# Patient Record
Sex: FEMALE | Race: WHITE | NOT HISPANIC OR LATINO | URBAN - NONMETROPOLITAN AREA
[De-identification: names, ages, dates, MRNs, and addresses within clinical notes are randomized per-mention and may not be internally consistent; named-entity substitution may affect disease eponyms.]

---

## 2018-04-09 ENCOUNTER — APPOINTMENT (RX ONLY)
Dept: URBAN - NONMETROPOLITAN AREA CLINIC 13 | Facility: CLINIC | Age: 58
Setting detail: DERMATOLOGY
End: 2018-04-09

## 2018-04-09 DIAGNOSIS — Z41.9 ENCOUNTER FOR PROCEDURE FOR PURPOSES OTHER THAN REMEDYING HEALTH STATE, UNSPECIFIED: ICD-10-CM

## 2018-04-09 PROCEDURE — ? IPL COSMETIC

## 2018-04-09 ASSESSMENT — LOCATION ZONE DERM: LOCATION ZONE: FACE

## 2018-04-09 ASSESSMENT — LOCATION SIMPLE DESCRIPTION DERM: LOCATION SIMPLE: RIGHT FOREHEAD

## 2018-04-09 ASSESSMENT — LOCATION DETAILED DESCRIPTION DERM: LOCATION DETAILED: RIGHT INFERIOR MEDIAL FOREHEAD

## 2018-04-09 NOTE — PROCEDURE: IPL COSMETIC
Wavelength (Include Units): 515,590, 800ST
Pre-Procedure Care: Prior to the procedure the patient and all present had protective eyewear in place and a warning sign was placed on the door.
Eye Protection: wavelength-specific goggles
Pulse Duration (Include Units): 10, 10 sec
Number Of Passes: 2,2,3
Detail Level: Detailed
End-Point And Post-Procedure Care: The procedure continued until mild erythema and darkening of pigment.
Consent: Prior to the procedure consent obtained, risks reviewed including but not limited to crusting, scabbing, blistering, scarring, darker or lighter pigmentary change, incidental hair removal, bruising, and/or incomplete removal.
Indication: age spots, redness,st
Post-Care Instructions: I reviewed with the patient in detail post-care instructions. Patient should stay away from the sun and wear sun protection until treated areas are fully healed.  I counseled on the need for more than one treatment to get desired result, and to absolutely not pick at pigment for she is a tiera 5 and at risk more for PIH.  I treated at a tiera 5 setting.  Treated face, neck and chest.
Ipl Type: forever young
Price (Use Numbers Only, No Special Characters Or $): 500
Fluence (Include Units): 8,12: 8,12: 12st

## 2018-09-24 ENCOUNTER — APPOINTMENT (RX ONLY)
Dept: URBAN - NONMETROPOLITAN AREA CLINIC 13 | Facility: CLINIC | Age: 58
Setting detail: DERMATOLOGY
End: 2018-09-24

## 2018-09-24 DIAGNOSIS — Z41.9 ENCOUNTER FOR PROCEDURE FOR PURPOSES OTHER THAN REMEDYING HEALTH STATE, UNSPECIFIED: ICD-10-CM

## 2018-09-24 PROCEDURE — ? IPL COSMETIC

## 2018-09-24 ASSESSMENT — LOCATION ZONE DERM: LOCATION ZONE: FACE

## 2018-09-24 ASSESSMENT — LOCATION DETAILED DESCRIPTION DERM: LOCATION DETAILED: GLABELLA

## 2018-09-24 ASSESSMENT — LOCATION SIMPLE DESCRIPTION DERM: LOCATION SIMPLE: GLABELLA

## 2018-09-24 NOTE — PROCEDURE: IPL COSMETIC
Post-Care Instructions: I reviewed with the patient in detail post-care instructions. Patient should stay away from the sun and wear sun protection until treated areas are fully healed.
Ipl Type: forever Young
Consent: Prior to the procedure consent obtained, risks reviewed including but not limited to crusting, scabbing, blistering, scarring, darker or lighter pigmentary change, incidental hair removal, bruising, and/or incomplete removal.
Number Of Passes: 2,2,3
Indication: age spots, redness ST
Pulse Duration (Include Units): 10,20,10 sec
Fluence (Include Units): 7,9: 8,10; 12
Wavelength (Include Units): 515, 560,ST face, neck
Detail Level: Detailed
Price (Use Numbers Only, No Special Characters Or $): 500
Pre-Procedure Care: Prior to the procedure the patient and all present had protective eyewear in place and a warning sign was placed on the door..
Eye Protection: wavelength-specific goggles
End-Point And Post-Procedure Care: The procedure continued until mild erythema and darkening of pigment.
Clinical Endpoint: erythema

## 2018-12-06 ENCOUNTER — APPOINTMENT (RX ONLY)
Dept: URBAN - METROPOLITAN AREA CLINIC 1 | Facility: CLINIC | Age: 58
Setting detail: DERMATOLOGY
End: 2018-12-06

## 2018-12-06 VITALS — WEIGHT: 125 LBS | HEIGHT: 63 IN

## 2018-12-06 DIAGNOSIS — L82.0 INFLAMED SEBORRHEIC KERATOSIS: ICD-10-CM

## 2018-12-06 PROBLEM — L70.0 ACNE VULGARIS: Status: ACTIVE | Noted: 2018-12-06

## 2018-12-06 PROBLEM — L55.1 SUNBURN OF SECOND DEGREE: Status: ACTIVE | Noted: 2018-12-06

## 2018-12-06 PROBLEM — L57.0 ACTINIC KERATOSIS: Status: ACTIVE | Noted: 2018-12-06

## 2018-12-06 PROBLEM — I10 ESSENTIAL (PRIMARY) HYPERTENSION: Status: ACTIVE | Noted: 2018-12-06

## 2018-12-06 PROBLEM — J30.1 ALLERGIC RHINITIS DUE TO POLLEN: Status: ACTIVE | Noted: 2018-12-06

## 2018-12-06 PROCEDURE — 17110 DESTRUCTION B9 LES UP TO 14: CPT

## 2018-12-06 PROCEDURE — ? LIQUID NITROGEN

## 2018-12-06 ASSESSMENT — LOCATION DETAILED DESCRIPTION DERM: LOCATION DETAILED: RIGHT INFERIOR FOREHEAD

## 2018-12-06 ASSESSMENT — LOCATION SIMPLE DESCRIPTION DERM: LOCATION SIMPLE: RIGHT FOREHEAD

## 2018-12-06 ASSESSMENT — LOCATION ZONE DERM: LOCATION ZONE: FACE

## 2018-12-06 NOTE — PROCEDURE: LIQUID NITROGEN
Detail Level: Detailed
Medical Necessity Clause: This procedure was medically necessary because the lesions that were treated were:
Medical Necessity Information: It is in your best interest to select a reason for this procedure from the list below. All of these items fulfill various CMS LCD requirements except the new and changing color options.
Post-Care Instructions: I reviewed with the patient in detail post-care instructions. Patient is to wear sunprotection, and avoid picking at any of the treated lesions. Pt may apply Vaseline to crusted or scabbing areas.
Consent: The patient's consent was obtained including but not limited to risks of crusting, scabbing, blistering, scarring, darker or lighter pigmentary change, recurrence, incomplete removal and infection.
Include Z78.9 (Other Specified Conditions Influencing Health Status) As An Associated Diagnosis?: No

## 2019-04-29 ENCOUNTER — APPOINTMENT (RX ONLY)
Dept: URBAN - NONMETROPOLITAN AREA CLINIC 13 | Facility: CLINIC | Age: 59
Setting detail: DERMATOLOGY
End: 2019-04-29

## 2019-04-29 DIAGNOSIS — Z41.9 ENCOUNTER FOR PROCEDURE FOR PURPOSES OTHER THAN REMEDYING HEALTH STATE, UNSPECIFIED: ICD-10-CM

## 2019-04-29 PROCEDURE — ? SCITON BBL

## 2019-04-29 ASSESSMENT — LOCATION SIMPLE DESCRIPTION DERM: LOCATION SIMPLE: GLABELLA

## 2019-04-29 ASSESSMENT — LOCATION ZONE DERM: LOCATION ZONE: FACE

## 2019-04-29 ASSESSMENT — LOCATION DETAILED DESCRIPTION DERM: LOCATION DETAILED: GLABELLA

## 2019-04-29 NOTE — PROCEDURE: SCITON BBL
Cooling (In C): 15
Repetition Rate (Hz): 10
Passes: 2
Pulse Duration Units: milliseconds
Consent: Written consent obtained, risks reviewed including but not limited to crusting, scabbing, blistering, scarring, darker or lighter pigmentary change, bruising, and/or incomplete response.
Cooling ?: Yes
Location Override (Will Not Show Above If Text Entered): under eyes
Spot Size: Finesse Adapter Size: 15 x 15 mm square
Post-Care Instructions: I reviewed with the patient in detail post-care instructions. Patient should stay away from the sun and wear sun protection until treated areas are fully healed.
Fluence (J/Cm2): 20
Passes: 1
Indication Override (Will Not Show Above If Text Entered): Base Pass
Anesthesia Volume In Cc: 0
Price (Use Numbers Only, No Special Characters Or $): 500
Hide Repetition Rate?: No
Repetition Rate (Hz): 12
Preprocedure Text: The treatment areas were thoroughly cleaned. Clear ultrasound gel was applied to the treatment area. The area was treated with no immediate stacking of pulses.
Location Override (Will Not Show Above If Text Entered): nose
Total Area (Optional- Include Units): face, neck
Post Procedure Text: The patient tolerated the procedure well. Post care was reviewed with the patient.
Fluence (J/Cm2): 25
Fluence (J/Cm2): 8
Detail Level: Zone
Spot Size: Finesse Adapter Size: 7 mm round
Spot Size: Finesse Adapter Size: 15 x 45 mm (No Finesse Adapter)

## 2019-07-15 ENCOUNTER — APPOINTMENT (RX ONLY)
Dept: URBAN - NONMETROPOLITAN AREA CLINIC 13 | Facility: CLINIC | Age: 59
Setting detail: DERMATOLOGY
End: 2019-07-15

## 2019-07-15 DIAGNOSIS — D485 NEOPLASM OF UNCERTAIN BEHAVIOR OF SKIN: ICD-10-CM

## 2019-07-15 PROBLEM — D48.5 NEOPLASM OF UNCERTAIN BEHAVIOR OF SKIN: Status: ACTIVE | Noted: 2019-07-15

## 2019-07-15 PROCEDURE — 11104 PUNCH BX SKIN SINGLE LESION: CPT

## 2019-07-15 PROCEDURE — ? ADDITIONAL NOTES

## 2019-07-15 PROCEDURE — ? BIOPSY BY PUNCH METHOD

## 2019-07-15 ASSESSMENT — LOCATION SIMPLE DESCRIPTION DERM: LOCATION SIMPLE: LEFT FOREARM

## 2019-07-15 ASSESSMENT — LOCATION DETAILED DESCRIPTION DERM: LOCATION DETAILED: LEFT PROXIMAL DORSAL FOREARM

## 2019-07-15 ASSESSMENT — LOCATION ZONE DERM: LOCATION ZONE: ARM

## 2019-07-15 NOTE — PROCEDURE: BIOPSY BY PUNCH METHOD
X Size Of Lesion In Cm (Optional): 0
Detail Level: Detailed
Render Post-Care Instructions In Note?: no
Anesthesia Type: 1% lidocaine without epinephrine and a 1:10 solution of 8.4% sodium bicarbonate
Lab: [id number]
Post-Care Instructions: I reviewed with the patient in detail post-care instructions. Patient is to keep the biopsy site dry overnight, and then apply bacitracin twice daily until healed. Patient may apply hydrogen peroxide soaks to remove any crusting.
Dressing: bandage
Biopsy Type: H and E
Hemostasis: None
Was A Bandage Applied: Yes
Lab Facility: [id number]
Billing Type: Third-Party Bill
Home Suture Removal Text: Patient was provided a home suture removal kit and will remove their sutures at home.  If they have any questions or difficulties they will call the office.
Epidermal Sutures: 5-0 Novafil
Anesthesia Volume In Cc: 0.5
Consent: Verbal consent was obtained, wound care instructions given and the risks were reviewed including but not limited to scarring, infection, bleeding, scabbing, incomplete removal, nerve damage and allergy to anesthesia.
Notification Instructions: Patient will be notified of biopsy results. However, patient instructed to call the office if not contacted within 2 weeks.
Wound Care: Petrolatum
Punch Size In Mm: 3

## 2019-07-29 ENCOUNTER — APPOINTMENT (RX ONLY)
Dept: URBAN - NONMETROPOLITAN AREA CLINIC 13 | Facility: CLINIC | Age: 59
Setting detail: DERMATOLOGY
End: 2019-07-29

## 2019-07-29 DIAGNOSIS — Z41.9 ENCOUNTER FOR PROCEDURE FOR PURPOSES OTHER THAN REMEDYING HEALTH STATE, UNSPECIFIED: ICD-10-CM

## 2019-07-29 PROCEDURE — ? SCITON BBL

## 2019-07-29 ASSESSMENT — LOCATION SIMPLE DESCRIPTION DERM: LOCATION SIMPLE: GLABELLA

## 2019-07-29 ASSESSMENT — LOCATION DETAILED DESCRIPTION DERM: LOCATION DETAILED: GLABELLA

## 2019-07-29 ASSESSMENT — LOCATION ZONE DERM: LOCATION ZONE: FACE

## 2019-07-29 NOTE — PROCEDURE: SCITON BBL
Cooling ?: Yes
Spot Size: Finesse Adapter Size: 15 x 15 mm square
Hide Repetition Rate?: No
Post Procedure Text: The patient tolerated the procedure well. Post care was reviewed with the patient.
Cooling (In C): 15
Price (Use Numbers Only, No Special Characters Or $): 500
Fluence (J/Cm2): 8
Fluence (J/Cm2): 20
Repetition Rate (Hz): 0
Repetition Rate (Hz): 10
Pulse Duration Units: milliseconds
Passes: 3
Spot Size: Finesse Adapter Size: 7 mm round
Repetition Rate (Hz): 12
Total Area (Optional- Include Units): face, neck
Spot Size: Finesse Adapter Size: 15 x 45 mm (No Finesse Adapter)
Location Override (Will Not Show Above If Text Entered): under eyes
Passes: 2
Indication Override (Will Not Show Above If Text Entered): Base Pass
Treatment Number: 1
Consent: Written consent obtained, risks reviewed including but not limited to crusting, scabbing, blistering, scarring, darker or lighter pigmentary change, bruising, and/or incomplete response.
Preprocedure Text: The treatment areas were thoroughly cleaned. Clear ultrasound gel was applied to the treatment area. The area was treated with no immediate stacking of pulses.
Post-Care Instructions: I reviewed with the patient in detail post-care instructions. Patient should stay away from the sun and wear sun protection until treated areas are fully healed.
Detail Level: Zone

## 2020-02-27 ENCOUNTER — APPOINTMENT (RX ONLY)
Dept: URBAN - NONMETROPOLITAN AREA CLINIC 13 | Facility: CLINIC | Age: 60
Setting detail: DERMATOLOGY
End: 2020-02-27

## 2020-02-27 DIAGNOSIS — Z41.9 ENCOUNTER FOR PROCEDURE FOR PURPOSES OTHER THAN REMEDYING HEALTH STATE, UNSPECIFIED: ICD-10-CM

## 2020-02-27 PROCEDURE — ? SCITON BBL

## 2020-02-27 ASSESSMENT — LOCATION SIMPLE DESCRIPTION DERM
LOCATION SIMPLE: GLABELLA
LOCATION SIMPLE: RIGHT ANTERIOR NECK
LOCATION SIMPLE: LEFT ANTERIOR NECK

## 2020-02-27 ASSESSMENT — LOCATION DETAILED DESCRIPTION DERM
LOCATION DETAILED: LEFT SUPERIOR LATERAL NECK
LOCATION DETAILED: GLABELLA
LOCATION DETAILED: LEFT INFERIOR ANTERIOR NECK
LOCATION DETAILED: RIGHT INFERIOR ANTERIOR NECK
LOCATION DETAILED: LEFT INFERIOR LATERAL NECK
LOCATION DETAILED: RIGHT SUPERIOR ANTERIOR NECK
LOCATION DETAILED: RIGHT SUPERIOR LATERAL NECK
LOCATION DETAILED: LEFT SUPERIOR ANTERIOR NECK
LOCATION DETAILED: RIGHT INFERIOR LATERAL NECK

## 2020-02-27 ASSESSMENT — LOCATION ZONE DERM
LOCATION ZONE: NECK
LOCATION ZONE: FACE

## 2020-02-27 NOTE — PROCEDURE: SCITON BBL
Fluence (J/Cm2): 14
Post-Care Instructions: I reviewed with the patient in detail post-care instructions. Patient should stay away from the sun and wear sun protection until treated areas are fully healed.
Price (Use Numbers Only, No Special Characters Or $): 757
Cooling ?: Yes
Indication Override (Will Not Show Above If Text Entered): [place]
Cooling (In C): 15
Repetition Rate (Hz): 1
Location Override (Will Not Show Above If Text Entered): upper cheeks
Hide Repetition Rate?: No
Spot Size: Finesse Adapter Size: 15 x 15 mm square
Fluence (J/Cm2): 8
Indication Override (Will Not Show Above If Text Entered): around nose
Cooling (In C): 20
Cooling (In C): 30
Spot Size: Finesse Adapter Size: 15 x 45 mm (No Finesse Adapter)
Additional Comments (Optional): 15J/cm2 used with [place] spot size to treat select brown dyschromias
Pulse Duration Units: milliseconds
Passes: 2
Repetition Rate (Hz): 10
Fluence (J/Cm2): 12
Treatment Number: 5
Anesthesia Volume In Cc: 0
Passes: 3
Additional Comments (Optional): 10J/cm2 used with square spot size on upper lip, around eyes, forehead, nose
Indication Override (Will Not Show Above If Text Entered): Forever Young Base Passes
Spot Size: Finesse Adapter Size: 7 mm round
Detail Level: Zone
Pulse Duration Units: seconds
Cooling (In C): 25
Preprocedure Text: The treatment areas were thoroughly cleaned. Clear ultrasound gel was applied to the treatment area. The area was treated with no immediate stacking of pulses.
Post Procedure Text: The patient tolerated the procedure well, reaching clinical end point of mild erythema, slight darkening of dyschromias. Post care was reviewed with the patient.
Consent: Written consent obtained, risks reviewed including but not limited to crusting, scabbing, blistering, scarring, darker or lighter pigmentary change, bruising, and/or incomplete response.

## 2020-03-03 ENCOUNTER — APPOINTMENT (RX ONLY)
Dept: URBAN - NONMETROPOLITAN AREA CLINIC 13 | Facility: CLINIC | Age: 60
Setting detail: DERMATOLOGY
End: 2020-03-03

## 2020-03-03 VITALS — WEIGHT: 128 LBS | HEIGHT: 63 IN

## 2020-03-03 DIAGNOSIS — D22 MELANOCYTIC NEVI: ICD-10-CM

## 2020-03-03 DIAGNOSIS — L57.8 OTHER SKIN CHANGES DUE TO CHRONIC EXPOSURE TO NONIONIZING RADIATION: ICD-10-CM

## 2020-03-03 DIAGNOSIS — L82.1 OTHER SEBORRHEIC KERATOSIS: ICD-10-CM

## 2020-03-03 DIAGNOSIS — D18.0 HEMANGIOMA: ICD-10-CM

## 2020-03-03 PROBLEM — D18.01 HEMANGIOMA OF SKIN AND SUBCUTANEOUS TISSUE: Status: ACTIVE | Noted: 2020-03-03

## 2020-03-03 PROBLEM — D22.5 MELANOCYTIC NEVI OF TRUNK: Status: ACTIVE | Noted: 2020-03-03

## 2020-03-03 PROCEDURE — ? SUNSCREEN RECOMMENDATIONS

## 2020-03-03 PROCEDURE — ? COUNSELING

## 2020-03-03 PROCEDURE — 99213 OFFICE O/P EST LOW 20 MIN: CPT

## 2020-03-03 ASSESSMENT — LOCATION DETAILED DESCRIPTION DERM
LOCATION DETAILED: SUPERIOR THORACIC SPINE
LOCATION DETAILED: LEFT MEDIAL UPPER BACK
LOCATION DETAILED: LEFT SUPERIOR MEDIAL MIDBACK

## 2020-03-03 ASSESSMENT — LOCATION SIMPLE DESCRIPTION DERM
LOCATION SIMPLE: LEFT UPPER BACK
LOCATION SIMPLE: UPPER BACK
LOCATION SIMPLE: LEFT LOWER BACK

## 2020-03-03 ASSESSMENT — LOCATION ZONE DERM: LOCATION ZONE: TRUNK

## 2020-10-08 ENCOUNTER — APPOINTMENT (RX ONLY)
Dept: URBAN - NONMETROPOLITAN AREA CLINIC 13 | Facility: CLINIC | Age: 60
Setting detail: DERMATOLOGY
End: 2020-10-08

## 2020-10-08 DIAGNOSIS — Z41.9 ENCOUNTER FOR PROCEDURE FOR PURPOSES OTHER THAN REMEDYING HEALTH STATE, UNSPECIFIED: ICD-10-CM

## 2020-10-08 PROCEDURE — ? SCITON BBL

## 2020-10-08 ASSESSMENT — LOCATION DETAILED DESCRIPTION DERM
LOCATION DETAILED: RIGHT INFERIOR LATERAL NECK
LOCATION DETAILED: LEFT INFERIOR ANTERIOR NECK
LOCATION DETAILED: RIGHT SUPERIOR LATERAL NECK
LOCATION DETAILED: LEFT INFERIOR LATERAL NECK
LOCATION DETAILED: RIGHT SUPERIOR ANTERIOR NECK
LOCATION DETAILED: GLABELLA
LOCATION DETAILED: RIGHT INFERIOR ANTERIOR NECK
LOCATION DETAILED: LEFT SUPERIOR ANTERIOR NECK
LOCATION DETAILED: LEFT SUPERIOR LATERAL NECK

## 2020-10-08 ASSESSMENT — LOCATION ZONE DERM
LOCATION ZONE: NECK
LOCATION ZONE: FACE

## 2020-10-08 ASSESSMENT — LOCATION SIMPLE DESCRIPTION DERM
LOCATION SIMPLE: RIGHT ANTERIOR NECK
LOCATION SIMPLE: LEFT ANTERIOR NECK
LOCATION SIMPLE: GLABELLA

## 2020-10-08 NOTE — PROCEDURE: SCITON BBL
Location Override (Will Not Show Above If Text Entered): upper cheeks
Repetition Rate (Hz): 1
Cooling ?: Yes
Additional Comments (Optional): square spot sized used at 10J/cm2 on upper lip, nose, around eyes, forehead
Indication Override (Will Not Show Above If Text Entered): skin laxity
Passes: 2
Cooling (In C): 15
Spot Size: Finesse Adapter Size: 15 x 45 mm (No Finesse Adapter)
Pulse Duration Units: milliseconds
Detail Level: Zone
Repetition Rate (Hz): 10
Fluence (J/Cm2): 8
Additional Comments (Optional): 13J/cm2 used on forehead-select browns treated with1-2 additional passes using [place] spot size using 15J/cm2, 10ms, 25C-neck treated with no finesse adapter using 10J/cm2, 15ms, 20C
Cooling (In C): 30
Fluence (J/Cm2): 25
Passes: 3
Anesthesia Volume In Cc: 0
Pulse Duration: 20
Spot Size: Finesse Adapter Size: 15 x 15 mm square
Fluence (J/Cm2): 12
Preprocedure Text: The treatment areas were thoroughly cleaned. Clear ultrasound gel was applied to the treatment area. The area was treated with no immediate stacking of pulses.
Price (Use Numbers Only, No Special Characters Or $): 699
Additional Comments (Optional): around nose treated with [place] spot size using 20J/cm2, 15ms, 20C
Consent: Written consent obtained, risks reviewed including but not limited to crusting, scabbing, blistering, scarring, darker or lighter pigmentary change, bruising, and/or incomplete response.
Post Procedure Text: The patient tolerated the procedure well, reaching clinical end point of mild erythema, slight darkening to darkening of dyschromias. Post care was reviewed with the patient.
Indication Override (Will Not Show Above If Text Entered): Forever Young Base Passes
Fluence (J/Cm2): 14
Pulse Duration Units: seconds
Hide Repetition Rate?: No
Post-Care Instructions: I reviewed with the patient in detail post-care instructions. Patient should stay away from the sun and wear sun protection until treated areas are fully healed.

## 2021-05-17 ENCOUNTER — APPOINTMENT (RX ONLY)
Dept: URBAN - NONMETROPOLITAN AREA CLINIC 13 | Facility: CLINIC | Age: 61
Setting detail: DERMATOLOGY
End: 2021-05-17

## 2021-05-17 DIAGNOSIS — Z41.9 ENCOUNTER FOR PROCEDURE FOR PURPOSES OTHER THAN REMEDYING HEALTH STATE, UNSPECIFIED: ICD-10-CM

## 2021-05-17 PROCEDURE — ? SCITON BBL

## 2021-05-17 ASSESSMENT — LOCATION SIMPLE DESCRIPTION DERM
LOCATION SIMPLE: RIGHT ANTERIOR NECK
LOCATION SIMPLE: LEFT ANTERIOR NECK
LOCATION SIMPLE: GLABELLA

## 2021-05-17 ASSESSMENT — LOCATION DETAILED DESCRIPTION DERM
LOCATION DETAILED: LEFT INFERIOR LATERAL NECK
LOCATION DETAILED: GLABELLA
LOCATION DETAILED: RIGHT INFERIOR LATERAL NECK
LOCATION DETAILED: RIGHT INFERIOR ANTERIOR NECK
LOCATION DETAILED: LEFT INFERIOR ANTERIOR NECK
LOCATION DETAILED: LEFT SUPERIOR ANTERIOR NECK
LOCATION DETAILED: RIGHT SUPERIOR ANTERIOR NECK
LOCATION DETAILED: LEFT SUPERIOR LATERAL NECK
LOCATION DETAILED: RIGHT SUPERIOR LATERAL NECK

## 2021-05-17 ASSESSMENT — LOCATION ZONE DERM
LOCATION ZONE: FACE
LOCATION ZONE: NECK

## 2021-05-17 NOTE — PROCEDURE: SCITON BBL
Preprocedure Text: The treatment areas were thoroughly cleaned. Clear ultrasound gel was applied to the treatment area. The area was treated with no immediate stacking of pulses.
Pulse Duration: 20
Cooling ?: Yes
Fluence (J/Cm2): 15
Additional Comments (Optional): 13J/cm2 used on forehead, select browns spot treated using [place] spot at 15J/cm2, 10ms, 25C x1-2 passes-neck treated with no adapter at 10J/cm2, 15ms, 20C x1 passssss
Price (Use Numbers Only, No Special Characters Or $): 500
Consent: Written consent obtained, risks reviewed including but not limited to crusting, scabbing, blistering, scarring, darker or lighter pigmentary change, bruising, and/or incomplete response.
Post Procedure Text: The patient tolerated the procedure well, reaching clinical end point of mild erythema, slight darkening to darkening of dyschromias. Post care was reviewed with the patient.
Fluence (J/Cm2): 25
Pulse Duration Units: milliseconds
Passes: 1
Post-Care Instructions: I reviewed with the patient in detail post-care instructions. Patient should stay away from the sun and wear sun protection until treated areas are fully healed.
Spot Size: Finesse Adapter Size: 15 x 15 mm square
Hide Repetition Rate?: No
Additional Comments (Optional): [place] spot used to treat around nose at 20J/cm2, 15ms, 20C
Fluence (J/Cm2): 8
Spot Size: Finesse Adapter Size: 15 x 45 mm (No Finesse Adapter)
Repetition Rate (Hz): 10
Fluence (J/Cm2): 14
Detail Level: Zone
Passes: 2
Anesthesia Volume In Cc: 0
Additional Comments (Optional): 9J/cm2 used with square spot size-neck treated using 20ms
Indication Override (Will Not Show Above If Text Entered): Forever Young Base Passes
Location Override (Will Not Show Above If Text Entered): upper cheeks, around nose

## 2021-08-13 ENCOUNTER — APPOINTMENT (RX ONLY)
Dept: URBAN - METROPOLITAN AREA CLINIC 1 | Facility: CLINIC | Age: 61
Setting detail: DERMATOLOGY
End: 2021-08-13

## 2021-08-13 DIAGNOSIS — Z41.9 ENCOUNTER FOR PROCEDURE FOR PURPOSES OTHER THAN REMEDYING HEALTH STATE, UNSPECIFIED: ICD-10-CM

## 2021-08-13 PROCEDURE — ? INFINI

## 2021-08-13 ASSESSMENT — LOCATION SIMPLE DESCRIPTION DERM
LOCATION SIMPLE: LEFT FOREHEAD
LOCATION SIMPLE: RIGHT FOREHEAD
LOCATION SIMPLE: LEFT CHEEK
LOCATION SIMPLE: LEFT SCALP
LOCATION SIMPLE: INFERIOR FOREHEAD
LOCATION SIMPLE: CHIN
LOCATION SIMPLE: SUPERIOR FOREHEAD
LOCATION SIMPLE: SUBMENTAL CHIN
LOCATION SIMPLE: RIGHT CHEEK
LOCATION SIMPLE: NECK
LOCATION SIMPLE: NOSE

## 2021-08-13 ASSESSMENT — LOCATION DETAILED DESCRIPTION DERM
LOCATION DETAILED: LEFT CENTRAL BUCCAL CHEEK
LOCATION DETAILED: LEFT MEDIAL FRONTAL SCALP
LOCATION DETAILED: LEFT SUPERIOR CENTRAL MALAR CHEEK
LOCATION DETAILED: LEFT LATERAL FOREHEAD
LOCATION DETAILED: SUPERIOR MID FOREHEAD
LOCATION DETAILED: RIGHT CHIN
LOCATION DETAILED: LEFT INFERIOR CENTRAL MALAR CHEEK
LOCATION DETAILED: RIGHT SUPERIOR LATERAL MALAR CHEEK
LOCATION DETAILED: RIGHT CENTRAL BUCCAL CHEEK
LOCATION DETAILED: RIGHT CENTRAL LATERAL NECK
LOCATION DETAILED: NASAL SUPRATIP
LOCATION DETAILED: RIGHT INFERIOR LATERAL FOREHEAD
LOCATION DETAILED: INFERIOR MID FOREHEAD
LOCATION DETAILED: SUBMENTAL CHIN
LOCATION DETAILED: RIGHT INFERIOR CENTRAL MALAR CHEEK
LOCATION DETAILED: RIGHT SUPERIOR FOREHEAD
LOCATION DETAILED: LEFT CENTRAL LATERAL NECK

## 2021-08-13 ASSESSMENT — LOCATION ZONE DERM
LOCATION ZONE: FACE
LOCATION ZONE: NECK
LOCATION ZONE: NOSE
LOCATION ZONE: SCALP

## 2021-08-13 NOTE — PROCEDURE: INFINI
Detail Level: Detailed
Passes (Will Not Render If 0): 3
Passes (Will Not Render If 0): 0
Needle Depth (Optional): 1.0 mm
Location #3: under eye
Intensity (Optional): High
Level: 1
Consent: Written consent obtained, risks reviewed including but not limited to pain, scarring, infection and incomplete improvement.  Common side effects include temporary redness and mild sunburn like effects that may last a few hours to a couple of days. Pigmentary changes can also occur. Patient understands the procedure is cosmetic in nature and will require out of pocket payment.
Needle Depth (Optional): 2.0 mm
Passes (Will Not Render If 0): 2
Needle Depth (Optional): 2.25 mm
Intensity (Optional): Medium
Post-Care Instructions: After the procedure, take precautions against sun exposure. Do not rub or scrub the area and wait 12 hours before applying sunscreen. Samples of Lytic given as well as Resveratrol and CE Ferulic. SS
Price (Use Numbers Only, No Special Characters Or $): 1200.00
Tip Size (Optional): 49 needles (7x7 needles, 10x10 mm)
Location #1: forehead
Location #4: neck
Location #2: lower face

## 2021-11-11 ENCOUNTER — APPOINTMENT (RX ONLY)
Dept: URBAN - NONMETROPOLITAN AREA CLINIC 13 | Facility: CLINIC | Age: 61
Setting detail: DERMATOLOGY
End: 2021-11-11

## 2021-11-11 DIAGNOSIS — Z41.9 ENCOUNTER FOR PROCEDURE FOR PURPOSES OTHER THAN REMEDYING HEALTH STATE, UNSPECIFIED: ICD-10-CM

## 2021-11-11 PROCEDURE — ? ADDITIONAL NOTES

## 2021-11-11 NOTE — PROCEDURE: ADDITIONAL NOTES
Render Risk Assessment In Note?: no
Detail Level: Simple
Additional Notes: Location: lower face, submental\\nNeedle Depth: 2.25 mm\\nPasses: 1\\nIntensity: High\\nLevel: 1\\n\\nLocation: lower face, submenatl\\nNeedle Depth: 2.0 mm\\nPasses: 1\\nIntensity: High\\nLevel: 1\\n\\nLocation: lower face , submental\\nNeedle Depth: 1.5 mm\\nPasses: 1\\nIntensity: Medium\\nLevel: 1\\n\\nLocation: neck\\nNeedle Depth: 2.0 mm\\nPasses: 1\\nIntensity: Medium\\nLevel: 1\\n\\nLocation: neck\\nNeedle Depth: 1.5 mm\\nPasses: 1\\nIntensity: Medium\\nLevel: 1\\n\\nLocation: neck\\nNeedle Depth: 1.5 mm\\nPasses: 1\\nIntensity: Medium\\nLevel: 1\\n\\nLocation: under eye, forehead\\nNeedle Depth: 1.0 mm\\nPasses: 1\\nIntensity: Medium\\nLevel: 1\\n\\nLocation: under eye, forehead\\nNeedle Depth: 1.0 mm\\nPasses: 2\\nIntensity: Low\\nLevel: 1\\n\\nWritten consent obtained, risks reviewed including but not limited to pain, scarring, infection and incomplete improvement. Common side effects include temporary redness and mild sunburn like effects that may last a few hours to a couple of days. Pigmentary changes can also occur. Patient understands the procedure is cosmetic in nature and will require out of pocket payment. After the procedure, take precautions against sun exposure. Do not rub or scrub the area and wait 12 hours before applying make-up. Last of package of 3.

## 2022-09-15 ENCOUNTER — APPOINTMENT (RX ONLY)
Dept: URBAN - NONMETROPOLITAN AREA CLINIC 13 | Facility: CLINIC | Age: 62
Setting detail: DERMATOLOGY
End: 2022-09-15

## 2022-09-15 DIAGNOSIS — Z41.9 ENCOUNTER FOR PROCEDURE FOR PURPOSES OTHER THAN REMEDYING HEALTH STATE, UNSPECIFIED: ICD-10-CM

## 2022-09-15 PROCEDURE — ? SCITON BBL

## 2022-09-15 ASSESSMENT — LOCATION SIMPLE DESCRIPTION DERM: LOCATION SIMPLE: GLABELLA

## 2022-09-15 ASSESSMENT — LOCATION DETAILED DESCRIPTION DERM: LOCATION DETAILED: GLABELLA

## 2022-09-15 ASSESSMENT — LOCATION ZONE DERM: LOCATION ZONE: FACE

## 2022-09-15 NOTE — PROCEDURE: SCITON BBL
Passes: 1
Consent: Written consent obtained, risks reviewed including but not limited to crusting, scabbing, blistering, scarring, darker or lighter pigmentary change, bruising, and/or incomplete response.
Pulse Duration Units: microseconds
Spot Size: Finesse Adapter Size: 15 x 15 mm square
Pulse Duration Units: milliseconds
Cooling ?: Yes
Passes: 2
Cooling (In C): 15
Price (Use Numbers Only, No Special Characters Or $): 500
Indication Override (Will Not Show Above If Text Entered): base
Post-Care Instructions: I reviewed with the patient in detail post-care instructions. Patient should stay away from the sun and wear sun protection until treated areas are fully healed.
Location Override (Will Not Show Above If Text Entered): nose
Repetition Rate (Hz): 10
Repetition Rate (Hz): 12
Hide Repetition Rate?: No
Fluence (J/Cm2): 22
Fluence (J/Cm2): 8
Post Procedure Text: The patient tolerated the procedure well. Post care was reviewed with the patient.
Pulse Duration: 20
Spot Size: Finesse Adapter Size: 15 x 45 mm (No Finesse Adapter)
Spot Size: Finesse Adapter Size: 7 mm round
Fluence (J/Cm2): 25
Detail Level: Zone
Anesthesia Volume In Cc: 0
Preprocedure Text: The treatment areas were thoroughly cleaned. Clear ultrasound gel was applied to the treatment area. The area was treated with no immediate stacking of pulses.
Treated Area: small area

## 2023-10-03 ENCOUNTER — APPOINTMENT (RX ONLY)
Dept: URBAN - NONMETROPOLITAN AREA CLINIC 13 | Facility: CLINIC | Age: 63
Setting detail: DERMATOLOGY
End: 2023-10-03

## 2023-10-03 DIAGNOSIS — D18.0 HEMANGIOMA: ICD-10-CM | Status: STABLE

## 2023-10-03 DIAGNOSIS — L57.8 OTHER SKIN CHANGES DUE TO CHRONIC EXPOSURE TO NONIONIZING RADIATION: ICD-10-CM | Status: STABLE

## 2023-10-03 DIAGNOSIS — D22 MELANOCYTIC NEVI: ICD-10-CM | Status: STABLE

## 2023-10-03 PROBLEM — D22.5 MELANOCYTIC NEVI OF TRUNK: Status: ACTIVE | Noted: 2023-10-03

## 2023-10-03 PROBLEM — D18.01 HEMANGIOMA OF SKIN AND SUBCUTANEOUS TISSUE: Status: ACTIVE | Noted: 2023-10-03

## 2023-10-03 PROCEDURE — ? COUNSELING

## 2023-10-03 PROCEDURE — 99213 OFFICE O/P EST LOW 20 MIN: CPT

## 2023-10-03 PROCEDURE — ? SUNSCREEN RECOMMENDATIONS

## 2023-10-03 ASSESSMENT — LOCATION SIMPLE DESCRIPTION DERM
LOCATION SIMPLE: CHEST
LOCATION SIMPLE: RIGHT SHOULDER
LOCATION SIMPLE: RIGHT UPPER BACK

## 2023-10-03 ASSESSMENT — LOCATION ZONE DERM
LOCATION ZONE: TRUNK
LOCATION ZONE: ARM

## 2023-10-03 ASSESSMENT — LOCATION DETAILED DESCRIPTION DERM
LOCATION DETAILED: RIGHT MEDIAL SUPERIOR CHEST
LOCATION DETAILED: RIGHT MID-UPPER BACK
LOCATION DETAILED: RIGHT ANTERIOR SHOULDER

## 2023-10-31 ENCOUNTER — APPOINTMENT (RX ONLY)
Dept: URBAN - NONMETROPOLITAN AREA CLINIC 13 | Facility: CLINIC | Age: 63
Setting detail: DERMATOLOGY
End: 2023-10-31

## 2023-10-31 DIAGNOSIS — Z41.9 ENCOUNTER FOR PROCEDURE FOR PURPOSES OTHER THAN REMEDYING HEALTH STATE, UNSPECIFIED: ICD-10-CM

## 2023-10-31 PROCEDURE — ? SCITON BBL

## 2023-10-31 ASSESSMENT — LOCATION ZONE DERM: LOCATION ZONE: FACE

## 2023-10-31 ASSESSMENT — LOCATION SIMPLE DESCRIPTION DERM: LOCATION SIMPLE: INFERIOR FOREHEAD

## 2023-10-31 ASSESSMENT — LOCATION DETAILED DESCRIPTION DERM: LOCATION DETAILED: INFERIOR MID FOREHEAD

## 2023-10-31 NOTE — PROCEDURE: SCITON BBL
Spot Size: Finesse Adapter Size: 15 x 45 mm (No Finesse Adapter)
Pulse Duration Units: milliseconds
Pulse Duration: 25
Pulse Duration: 20
Passes: 1
Passes: 2
Cooling (In C): 15
Detail Level: Zone
Repetition Rate (Hz): 10
Indication Override (Will Not Show Above If Text Entered): base
Spot Size: Finesse Adapter Size: 15 x 15 mm square
Cooling ?: Yes
Indication Override (Will Not Show Above If Text Entered): skin tyte
Consent: Written consent obtained, risks reviewed including but not limited to crusting, scabbing, blistering, scarring, darker or lighter pigmentary change, bruising, and/or incomplete response.
Fluence (J/Cm2): 13
Post-Care Instructions: I reviewed with the patient in detail post-care instructions. Patient should stay away from the sun and wear sun protection until treated areas are fully healed.
Hide Repetition Rate?: No
Repetition Rate (Hz): 12
Anesthesia Volume In Cc: 0
Preprocedure Text: The treatment areas were thoroughly cleaned. Clear ultrasound gel was applied to the treatment area. The area was treated with no immediate stacking of pulses.
Price (Use Numbers Only, No Special Characters Or $): 894
Fluence (J/Cm2): 8
Treated Area: large area
Post Procedure Text: The patient tolerated the procedure well. Post care was reviewed with the patient.
Passes: 3
Pulse Duration Units: seconds

## 2024-08-27 ENCOUNTER — APPOINTMENT (RX ONLY)
Dept: URBAN - NONMETROPOLITAN AREA CLINIC 13 | Facility: CLINIC | Age: 64
Setting detail: DERMATOLOGY
End: 2024-08-27

## 2024-08-27 DIAGNOSIS — Z41.9 ENCOUNTER FOR PROCEDURE FOR PURPOSES OTHER THAN REMEDYING HEALTH STATE, UNSPECIFIED: ICD-10-CM

## 2024-08-27 PROCEDURE — ? SCITON BBL

## 2024-08-27 ASSESSMENT — LOCATION SIMPLE DESCRIPTION DERM: LOCATION SIMPLE: GLABELLA

## 2024-08-27 ASSESSMENT — LOCATION DETAILED DESCRIPTION DERM: LOCATION DETAILED: GLABELLA

## 2024-08-27 ASSESSMENT — LOCATION ZONE DERM: LOCATION ZONE: FACE

## 2024-08-27 NOTE — PROCEDURE: SCITON BBL
Prepaid Number Of Treatments: 0
Add Treatment 5?: yes
Pulse Duration Units: milliseconds
Hide Repetition Rate?: No
Treatment Number: 1
Cooling (In C): 15
Passes: 2
Indication Override (Will Not Show Above If Text Entered): base
Detail Level: Zone
Repetition Rate (Hz): 10
Fluence (J/Cm2): 7
Fluence (J/Cm2): 12
Pulse Duration Units: seconds
Pulse Duration: 20
Passes: 3
Post Procedure Text: The patient tolerated the procedure well. Post care was reviewed with the patient.
Preprocedure Text: The treatment areas were thoroughly cleaned. Clear ultrasound gel was applied to the treatment area. The area was treated with no immediate stacking of pulses.
Spot Size: Finesse Adapter Size: 15 x 15 mm square
Fluence (J/Cm2): 11
Spot Size: Finesse Adapter Size: 15 x 45 mm (No Finesse Adapter)
Fluence (J/Cm2): 8
Consent: Written consent obtained, risks reviewed including but not limited to crusting, scabbing, blistering, scarring, darker or lighter pigmentary change, bruising, and/or incomplete response.
Pulse Duration: 18
Fluence (J/Cm2): 21
Spot Size: Finesse Adapter Size: 11 mm round
Post-Care Instructions: I reviewed with the patient in detail post-care instructions. Patient should stay away from the sun and wear sun protection until treated areas are fully healed.
Price (Use Numbers Only, No Special Characters Or $): 110
Indication Override (Will Not Show Above If Text Entered): skin Tyte
Treated Area: medium area

## 2025-01-09 ENCOUNTER — APPOINTMENT (OUTPATIENT)
Dept: URBAN - NONMETROPOLITAN AREA CLINIC 13 | Facility: CLINIC | Age: 65
Setting detail: DERMATOLOGY
End: 2025-01-09

## 2025-01-09 DIAGNOSIS — Z41.9 ENCOUNTER FOR PROCEDURE FOR PURPOSES OTHER THAN REMEDYING HEALTH STATE, UNSPECIFIED: ICD-10-CM

## 2025-01-09 PROCEDURE — ? MICRONEEDLING

## 2025-01-09 NOTE — PROCEDURE: MICRONEEDLING
Depth In Mm (Location #5): 0.5
Price (Use Numbers Only, No Special Characters Or $): 473
Treatment Number (Optional): 1
Location #3: cheeks
Location #2: under eyes
Topical Anesthesia?: 23% lidocaine, 7% tetracaine
Infusions (Optional): growth factor
Post-Care Instructions: After the procedure, take precautions agains sun exposure. Do not apply sunscreen for 12 hours after the procedure. Do not apply make-up for 12 hours after the procedure. Avoid alcohol based toners for 10-14 days. After 2-3 days patients can return to their regular skin regimen.  Told patient she would need more than one treatment, and absolutely no sun exposure on her face.
Length Of Topical Anesthesia Application (Optional): 20 minutes
Location #1: forehead
Detail Level: Zone
# Of Treatments In Package: 0
Location #4: lip
Depth In Mm (Location #2): 0.25
Consent: Written consent obtained, risks reviewed including but not limited to pain, scarring, infection and incomplete improvement.  Patient understands the procedure is cosmetic in nature and will require out of pocket payment.
Location #5: neck

## 2025-02-20 ENCOUNTER — APPOINTMENT (OUTPATIENT)
Dept: URBAN - NONMETROPOLITAN AREA CLINIC 13 | Facility: CLINIC | Age: 65
Setting detail: DERMATOLOGY
End: 2025-02-20

## 2025-02-20 DIAGNOSIS — Z41.9 ENCOUNTER FOR PROCEDURE FOR PURPOSES OTHER THAN REMEDYING HEALTH STATE, UNSPECIFIED: ICD-10-CM

## 2025-02-20 PROCEDURE — ? MICRONEEDLING

## 2025-02-20 NOTE — PROCEDURE: MICRONEEDLING
Infusions (Optional): growth factor
Location #6: neck
Depth In Mm (Location #2): 0.25
Topical Anesthesia?: 23% lidocaine, 7% tetracaine
Depth In Mm (Location #4): 0.5
Depth In Mm (Location #6): 0.75
Location #1: forehead
Length Of Topical Anesthesia Application (Optional): 20 minutes
Consent: Written consent obtained, risks reviewed including but not limited to pain, scarring, infection and incomplete improvement.  Patient understands the procedure is cosmetic in nature and will require out of pocket payment.
How Many Cc Of Bacteriostatic 0.9% Saline Were Added?: 0
Price (Use Numbers Only, No Special Characters Or $): 055.77
Treatment Number (Optional): 2
Location #3: cheeks
# Of Treatments In Package: 3
Detail Level: Zone
Location #4: chin
Location #2: undereye and nose
Post-Care Instructions: PT was given physical copy of micro needling post care. After the procedure, take precautions against sun exposure. Do not apply sunscreen for 12 hours after the procedure. Do not apply make-up for 12 hours after the procedure. Avoid alcohol-based toners for 10-14 days. After 2-3 days patients can return to their regular skin regimen.
Location #5: upper lip

## 2025-04-03 ENCOUNTER — APPOINTMENT (OUTPATIENT)
Dept: URBAN - NONMETROPOLITAN AREA CLINIC 13 | Facility: CLINIC | Age: 65
Setting detail: DERMATOLOGY
End: 2025-04-03

## 2025-04-03 DIAGNOSIS — Z41.9 ENCOUNTER FOR PROCEDURE FOR PURPOSES OTHER THAN REMEDYING HEALTH STATE, UNSPECIFIED: ICD-10-CM

## 2025-04-03 PROCEDURE — ? MICRONEEDLING

## 2025-04-03 NOTE — PROCEDURE: MICRONEEDLING
Post-Care Instructions: PT was given physical copy of micro needling post care. After the procedure, take precautions against sun exposure. Do not apply sunscreen for 12 hours after the procedure. Do not apply make-up for 12 hours after the procedure. Avoid alcohol-based toners for 10-14 days. After 2-3 days patients can return to their regular skin regimen.
How Many Cc Of Bacteriostatic 0.9% Saline Were Added?: 0
Depth In Mm (Location #5): 0.25
Location #2: cheeks, chin, and neck
Depth In Mm (Location #7): 0.75
Depth In Mm (Location #4): 0.5
Price (Use Numbers Only, No Special Characters Or $): 136
Topical Anesthesia?: 23% lidocaine, 7% tetracaine
Consent: Written consent obtained, risks reviewed including but not limited to pain, scarring, infection and incomplete improvement.  Patient understands the procedure is cosmetic in nature and will require out of pocket payment.
Treatment Number (Optional): 3
Infusions (Optional): growth factor
Detail Level: Zone
Location #1: Forehead, under eye, nose, upper lip
Length Of Topical Anesthesia Application (Optional): 15 minutes

## 2025-06-19 ENCOUNTER — APPOINTMENT (OUTPATIENT)
Dept: URBAN - NONMETROPOLITAN AREA CLINIC 13 | Facility: CLINIC | Age: 65
Setting detail: DERMATOLOGY
End: 2025-06-19

## 2025-06-19 DIAGNOSIS — Z41.9 ENCOUNTER FOR PROCEDURE FOR PURPOSES OTHER THAN REMEDYING HEALTH STATE, UNSPECIFIED: ICD-10-CM

## 2025-06-19 PROCEDURE — ? SCITON BBL

## 2025-06-19 ASSESSMENT — LOCATION SIMPLE DESCRIPTION DERM: LOCATION SIMPLE: GLABELLA

## 2025-06-19 ASSESSMENT — LOCATION ZONE DERM: LOCATION ZONE: FACE

## 2025-06-19 ASSESSMENT — LOCATION DETAILED DESCRIPTION DERM: LOCATION DETAILED: GLABELLA

## 2025-06-19 NOTE — PROCEDURE: SCITON BBL
Pulse Duration: 20
Spot Size: Finesse Adapter Size: 15 x 45 mm (No Finesse Adapter)
Price (Use Numbers Only, No Special Characters Or $): 673
Treated Area: small area
Hide Repetition Rate?: No
Anesthesia Volume In Cc: 0
Pulse Duration Units: milliseconds
Passes: 1
Cooling (In C): 15
Passes: 2
Add Treatment 4?: yes
Preprocedure Text: The treatment areas were thoroughly cleaned. Clear ultrasound gel was applied to the treatment area. The area was treated with no immediate stacking of pulses.
Repetition Rate (Hz): 10
Post Procedure Text: The patient tolerated the procedure well. Post care was reviewed with the patient.
Fluence (J/Cm2): 25
Indication Override (Will Not Show Above If Text Entered): base
Detail Level: Zone
Spot Size: Finesse Adapter Size: 15 x 15 mm square
Fluence (J/Cm2): 13
Repetition Rate (Hz): 12
Consent: Written consent obtained, risks reviewed including but not limited to crusting, scabbing, blistering, scarring, darker or lighter pigmentary change, bruising, and/or incomplete response.
Spot Size: Finesse Adapter Size: 11 mm round
Fluence (J/Cm2): 7
Fluence (J/Cm2): 8
Post-Care Instructions: I reviewed with the patient in detail post-care instructions. Patient should stay away from the sun and wear sun protection until treated areas are fully healed.
Fluence (J/Cm2): 21